# Patient Record
Sex: FEMALE | Race: WHITE | NOT HISPANIC OR LATINO
[De-identification: names, ages, dates, MRNs, and addresses within clinical notes are randomized per-mention and may not be internally consistent; named-entity substitution may affect disease eponyms.]

---

## 2017-08-08 DIAGNOSIS — Z98.890 OTHER SPECIFIED POSTPROCEDURAL STATES: ICD-10-CM

## 2017-08-10 PROBLEM — Z98.890 H/O COLONOSCOPY: Status: RESOLVED | Noted: 2017-08-10 | Resolved: 2017-08-10

## 2017-10-23 ENCOUNTER — APPOINTMENT (OUTPATIENT)
Dept: INTERNAL MEDICINE | Facility: CLINIC | Age: 57
End: 2017-10-23
Payer: COMMERCIAL

## 2017-10-23 ENCOUNTER — NON-APPOINTMENT (OUTPATIENT)
Age: 57
End: 2017-10-23

## 2017-10-23 VITALS
TEMPERATURE: 98.8 F | HEART RATE: 61 BPM | BODY MASS INDEX: 26.29 KG/M2 | DIASTOLIC BLOOD PRESSURE: 82 MMHG | SYSTOLIC BLOOD PRESSURE: 119 MMHG | HEIGHT: 64 IN | WEIGHT: 154 LBS | OXYGEN SATURATION: 99 %

## 2017-10-23 DIAGNOSIS — Z83.49 FAMILY HISTORY OF OTHER ENDOCRINE, NUTRITIONAL AND METABOLIC DISEASES: ICD-10-CM

## 2017-10-23 DIAGNOSIS — Z23 ENCOUNTER FOR IMMUNIZATION: ICD-10-CM

## 2017-10-23 DIAGNOSIS — Z92.89 PERSONAL HISTORY OF OTHER MEDICAL TREATMENT: ICD-10-CM

## 2017-10-23 DIAGNOSIS — Z98.890 OTHER SPECIFIED POSTPROCEDURAL STATES: ICD-10-CM

## 2017-10-23 PROCEDURE — 90670 PCV13 VACCINE IM: CPT

## 2017-10-23 PROCEDURE — 90686 IIV4 VACC NO PRSV 0.5 ML IM: CPT

## 2017-10-23 PROCEDURE — 90472 IMMUNIZATION ADMIN EACH ADD: CPT

## 2017-10-23 PROCEDURE — 99396 PREV VISIT EST AGE 40-64: CPT | Mod: 25

## 2017-10-23 PROCEDURE — 93000 ELECTROCARDIOGRAM COMPLETE: CPT

## 2017-10-23 PROCEDURE — 36415 COLL VENOUS BLD VENIPUNCTURE: CPT

## 2017-10-23 PROCEDURE — G0008: CPT

## 2017-10-30 LAB
25(OH)D3 SERPL-MCNC: 37 NG/ML
ALBUMIN SERPL ELPH-MCNC: 4.8 G/DL
ALP BLD-CCNC: 76 U/L
ALT SERPL-CCNC: 35 U/L
ANION GAP SERPL CALC-SCNC: 19 MMOL/L
APPEARANCE: CLEAR
AST SERPL-CCNC: 32 U/L
BASOPHILS # BLD AUTO: 0.01 K/UL
BASOPHILS NFR BLD AUTO: 0.2 %
BILIRUB SERPL-MCNC: 0.5 MG/DL
BILIRUBIN URINE: NEGATIVE
BLOOD URINE: NEGATIVE
BUN SERPL-MCNC: 13 MG/DL
CALCIUM SERPL-MCNC: 9.9 MG/DL
CHLORIDE SERPL-SCNC: 98 MMOL/L
CHOLEST SERPL-MCNC: 265 MG/DL
CHOLEST/HDLC SERPL: 2.6 RATIO
CO2 SERPL-SCNC: 24 MMOL/L
COLOR: YELLOW
CREAT SERPL-MCNC: 0.81 MG/DL
EOSINOPHIL # BLD AUTO: 0.1 K/UL
EOSINOPHIL NFR BLD AUTO: 2.1 %
GLUCOSE QUALITATIVE U: NEGATIVE MG/DL
GLUCOSE SERPL-MCNC: 99 MG/DL
HBA1C MFR BLD HPLC: 5.1 %
HCT VFR BLD CALC: 42.8 %
HCV AB SER QL: NONREACTIVE
HCV S/CO RATIO: 0.06 S/CO
HDLC SERPL-MCNC: 101 MG/DL
HGB BLD-MCNC: 13.9 G/DL
IMM GRANULOCYTES NFR BLD AUTO: 0 %
KETONES URINE: NEGATIVE
LDLC SERPL CALC-MCNC: 145 MG/DL
LEUKOCYTE ESTERASE URINE: NEGATIVE
LYMPHOCYTES # BLD AUTO: 1.6 K/UL
LYMPHOCYTES NFR BLD AUTO: 33.2 %
MAN DIFF?: NORMAL
MCHC RBC-ENTMCNC: 32.5 GM/DL
MCHC RBC-ENTMCNC: 33 PG
MCV RBC AUTO: 101.7 FL
MONOCYTES # BLD AUTO: 0.31 K/UL
MONOCYTES NFR BLD AUTO: 6.4 %
NEUTROPHILS # BLD AUTO: 2.8 K/UL
NEUTROPHILS NFR BLD AUTO: 58.1 %
NITRITE URINE: NEGATIVE
PH URINE: 7
PLATELET # BLD AUTO: 211 K/UL
POTASSIUM SERPL-SCNC: 4.6 MMOL/L
PROT SERPL-MCNC: 7.3 G/DL
PROTEIN URINE: NEGATIVE MG/DL
RBC # BLD: 4.21 M/UL
RBC # FLD: 14.7 %
SODIUM SERPL-SCNC: 141 MMOL/L
SPECIFIC GRAVITY URINE: 1.01
T4 FREE SERPL-MCNC: 1.1 NG/DL
TRIGL SERPL-MCNC: 96 MG/DL
TSH SERPL-ACNC: 1.51 UIU/ML
UROBILINOGEN URINE: NEGATIVE MG/DL
WBC # FLD AUTO: 4.82 K/UL

## 2017-11-07 ENCOUNTER — TRANSCRIPTION ENCOUNTER (OUTPATIENT)
Age: 57
End: 2017-11-07

## 2018-02-12 ENCOUNTER — MEDICATION RENEWAL (OUTPATIENT)
Age: 58
End: 2018-02-12

## 2018-07-20 ENCOUNTER — MEDICATION RENEWAL (OUTPATIENT)
Age: 58
End: 2018-07-20

## 2019-03-25 ENCOUNTER — LABORATORY RESULT (OUTPATIENT)
Age: 59
End: 2019-03-25

## 2019-03-25 ENCOUNTER — APPOINTMENT (OUTPATIENT)
Dept: INTERNAL MEDICINE | Facility: CLINIC | Age: 59
End: 2019-03-25
Payer: COMMERCIAL

## 2019-03-25 VITALS
SYSTOLIC BLOOD PRESSURE: 128 MMHG | WEIGHT: 153 LBS | BODY MASS INDEX: 26.12 KG/M2 | HEIGHT: 64 IN | HEART RATE: 67 BPM | OXYGEN SATURATION: 98 % | TEMPERATURE: 98.2 F | DIASTOLIC BLOOD PRESSURE: 86 MMHG

## 2019-03-25 DIAGNOSIS — Z85.89 PERSONAL HISTORY OF MALIGNANT NEOPLASM OF OTHER ORGANS AND SYSTEMS: ICD-10-CM

## 2019-03-25 DIAGNOSIS — F98.8 OTHER SPECIFIED BEHAVIORAL AND EMOTIONAL DISORDERS WITH ONSET USUALLY OCCURRING IN CHILDHOOD AND ADOLESCENCE: ICD-10-CM

## 2019-03-25 PROCEDURE — 99396 PREV VISIT EST AGE 40-64: CPT | Mod: 25

## 2019-03-25 PROCEDURE — 94010 BREATHING CAPACITY TEST: CPT

## 2019-03-25 PROCEDURE — 36415 COLL VENOUS BLD VENIPUNCTURE: CPT

## 2019-03-25 RX ORDER — BECLOMETHASONE DIPROPIONATE 40 UG/1
40 AEROSOL, METERED RESPIRATORY (INHALATION) TWICE DAILY
Qty: 1 | Refills: 6 | Status: DISCONTINUED | COMMUNITY
End: 2019-03-25

## 2019-03-25 RX ORDER — DEXTROAMPHETAMINE SACCHARATE, AMPHETAMINE ASPARTATE, DEXTROAMPHETAMINE SULFATE AND AMPHETAMINE SULFATE 2.5; 2.5; 2.5; 2.5 MG/1; MG/1; MG/1; MG/1
10 TABLET ORAL
Qty: 30 | Refills: 0 | Status: ACTIVE | COMMUNITY
Start: 2019-03-25

## 2019-03-25 NOTE — PHYSICAL EXAM
[No Acute Distress] : no acute distress [Well Nourished] : well nourished [Well Developed] : well developed [Well-Appearing] : well-appearing [Normal Sclera/Conjunctiva] : normal sclera/conjunctiva [EOMI] : extraocular movements intact [Normal Outer Ear/Nose] : the outer ears and nose were normal in appearance [Supple] : supple [No Lymphadenopathy] : no lymphadenopathy [Thyroid Normal, No Nodules] : the thyroid was normal and there were no nodules present [No Respiratory Distress] : no respiratory distress  [Clear to Auscultation] : lungs were clear to auscultation bilaterally [No Accessory Muscle Use] : no accessory muscle use [Normal Rate] : normal rate  [Regular Rhythm] : with a regular rhythm [Normal S1, S2] : normal S1 and S2 [No Murmur] : no murmur heard [No Varicosities] : no varicosities [No Edema] : there was no peripheral edema [No Extremity Clubbing/Cyanosis] : no extremity clubbing/cyanosis [Soft] : abdomen soft [Non Tender] : non-tender [Non-distended] : non-distended [No Masses] : no abdominal mass palpated [No HSM] : no HSM [Normal Supraclavicular Nodes] : no supraclavicular lymphadenopathy [Normal Anterior Cervical Nodes] : no anterior cervical lymphadenopathy [No Rash] : no rash [Normal Gait] : normal gait [Coordination Grossly Intact] : coordination grossly intact [No Focal Deficits] : no focal deficits [Normal Affect] : the affect was normal [Alert and Oriented x3] : oriented to person, place, and time [Normal Mood] : the mood was normal [Normal Insight/Judgement] : insight and judgment were intact

## 2019-03-25 NOTE — HISTORY OF PRESENT ILLNESS
[FreeTextEntry1] : annual exam [de-identified] : annual\par - doing well since last visit 10/2017\par - no new MD visits or hospitalizations\par \par asthma\par - well controlled\par - compliant w/ inhalers\par \par ADHD/depression\par - compliant w/ medications\par - follows w/ psych\par \par b/l hip pain\par - h/o labral tearing\par - last ortho eval was 5 yrs ago\par - at that time no surgical intervention needed\par - now will have b/l hip pain after simple house choirs ie vacuuming\par - pain will prevent her from sleeping. \par \par HCM\par - needs mammo

## 2019-03-28 LAB
25(OH)D3 SERPL-MCNC: 34.3 NG/ML
ALBUMIN SERPL ELPH-MCNC: 4.6 G/DL
ALP BLD-CCNC: 98 U/L
ALT SERPL-CCNC: 33 U/L
ANION GAP SERPL CALC-SCNC: 13 MMOL/L
APPEARANCE: CLEAR
AST SERPL-CCNC: 32 U/L
BASOPHILS # BLD AUTO: 0.02 K/UL
BASOPHILS NFR BLD AUTO: 0.3 %
BILIRUB SERPL-MCNC: 0.5 MG/DL
BILIRUBIN URINE: NEGATIVE
BLOOD URINE: NEGATIVE
BUN SERPL-MCNC: 13 MG/DL
CALCIUM SERPL-MCNC: 9.8 MG/DL
CHLORIDE SERPL-SCNC: 100 MMOL/L
CHOLEST SERPL-MCNC: 230 MG/DL
CHOLEST/HDLC SERPL: 2.6 RATIO
CO2 SERPL-SCNC: 26 MMOL/L
COLOR: NORMAL
CREAT SERPL-MCNC: 0.66 MG/DL
EOSINOPHIL # BLD AUTO: 0.1 K/UL
EOSINOPHIL NFR BLD AUTO: 1.7 %
ESTIMATED AVERAGE GLUCOSE: 103 MG/DL
GLUCOSE QUALITATIVE U: NEGATIVE
GLUCOSE SERPL-MCNC: 92 MG/DL
HBA1C MFR BLD HPLC: 5.2 %
HCT VFR BLD CALC: 43.4 %
HCV AB SER QL: NONREACTIVE
HCV S/CO RATIO: 0.06 S/CO
HDLC SERPL-MCNC: 89 MG/DL
HGB BLD-MCNC: 13.8 G/DL
IMM GRANULOCYTES NFR BLD AUTO: 0.3 %
KETONES URINE: NORMAL
LDLC SERPL CALC-MCNC: 125 MG/DL
LEUKOCYTE ESTERASE URINE: NEGATIVE
LYMPHOCYTES # BLD AUTO: 1.42 K/UL
LYMPHOCYTES NFR BLD AUTO: 23.7 %
MAN DIFF?: NORMAL
MCHC RBC-ENTMCNC: 31.8 GM/DL
MCHC RBC-ENTMCNC: 33.9 PG
MCV RBC AUTO: 106.6 FL
MONOCYTES # BLD AUTO: 0.44 K/UL
MONOCYTES NFR BLD AUTO: 7.4 %
NEUTROPHILS # BLD AUTO: 3.98 K/UL
NEUTROPHILS NFR BLD AUTO: 66.6 %
NITRITE URINE: NEGATIVE
PH URINE: 5
PLATELET # BLD AUTO: 229 K/UL
POTASSIUM SERPL-SCNC: 4.6 MMOL/L
PROT SERPL-MCNC: 7 G/DL
PROTEIN URINE: NEGATIVE
RBC # BLD: 4.07 M/UL
RBC # FLD: 13.7 %
SODIUM SERPL-SCNC: 139 MMOL/L
SPECIFIC GRAVITY URINE: 1.01
T4 FREE SERPL-MCNC: 1 NG/DL
TRIGL SERPL-MCNC: 78 MG/DL
TSH SERPL-ACNC: 2.29 UIU/ML
UROBILINOGEN URINE: NORMAL
WBC # FLD AUTO: 5.98 K/UL

## 2019-08-02 ENCOUNTER — MEDICATION RENEWAL (OUTPATIENT)
Age: 59
End: 2019-08-02

## 2019-10-09 ENCOUNTER — MEDICATION RENEWAL (OUTPATIENT)
Age: 59
End: 2019-10-09

## 2019-12-06 ENCOUNTER — RX RENEWAL (OUTPATIENT)
Age: 59
End: 2019-12-06

## 2019-12-23 ENCOUNTER — RX RENEWAL (OUTPATIENT)
Age: 59
End: 2019-12-23

## 2020-08-06 ENCOUNTER — APPOINTMENT (OUTPATIENT)
Dept: INTERNAL MEDICINE | Facility: CLINIC | Age: 60
End: 2020-08-06
Payer: COMMERCIAL

## 2020-08-06 VITALS
SYSTOLIC BLOOD PRESSURE: 128 MMHG | DIASTOLIC BLOOD PRESSURE: 82 MMHG | BODY MASS INDEX: 26.63 KG/M2 | HEART RATE: 73 BPM | HEIGHT: 64 IN | OXYGEN SATURATION: 98 % | TEMPERATURE: 98.5 F | WEIGHT: 156 LBS

## 2020-08-06 DIAGNOSIS — K44.9 DIAPHRAGMATIC HERNIA W/OUT OBSTRUCTION OR GANGRENE: ICD-10-CM

## 2020-08-06 DIAGNOSIS — Z11.59 ENCOUNTER FOR SCREENING FOR OTHER VIRAL DISEASES: ICD-10-CM

## 2020-08-06 DIAGNOSIS — Z23 ENCOUNTER FOR IMMUNIZATION: ICD-10-CM

## 2020-08-06 DIAGNOSIS — K21.9 GASTRO-ESOPHAGEAL REFLUX DISEASE W/OUT ESOPHAGITIS: ICD-10-CM

## 2020-08-06 PROCEDURE — 90732 PPSV23 VACC 2 YRS+ SUBQ/IM: CPT

## 2020-08-06 PROCEDURE — 36415 COLL VENOUS BLD VENIPUNCTURE: CPT

## 2020-08-06 PROCEDURE — G0009: CPT

## 2020-08-06 PROCEDURE — 99396 PREV VISIT EST AGE 40-64: CPT | Mod: 25

## 2020-08-06 NOTE — HEALTH RISK ASSESSMENT
[] : No [No] : No [Change in mental status noted] : No change in mental status noted [Language] : denies difficulty with language [Behavior] : denies difficulty with behavior [Handling Complex Tasks] : denies difficulty handling complex tasks [Learning/Retaining New Information] : denies difficulty learning/retaining new information [Spatial Ability and Orientation] : denies difficulty with spatial ability and orientation [Reasoning] : denies difficulty with reasoning [None] : None [With Family] : lives with family [] :  [Fully functional (using the telephone, shopping, preparing meals, housekeeping, doing laundry, using] : Fully functional and needs no help or supervision to perform IADLs (using the telephone, shopping, preparing meals, housekeeping, doing laundry, using transportation, managing medications and managing finances) [Fully functional (bathing, dressing, toileting, transferring, walking, feeding)] : Fully functional (bathing, dressing, toileting, transferring, walking, feeding) [With Patient/Caregiver] : With Patient/Caregiver [AdvancecareDate] : 08/06/2020

## 2020-08-06 NOTE — HISTORY OF PRESENT ILLNESS
[FreeTextEntry1] : annual exam [de-identified] : annual\par - doing well\par - w/o specific complaints\par - has been home during pandemic\par \par Asthma\par - well controlled\par - compiant w/ inhalers\par \par Acid reflux\par - worse b/c of hiatal hernia\par - last EGD 1 year ago\par - states h pylori negative\par - has been on PPI daily, regardless of sx.\par \par HCM\par - needs mammo\par - needs pna23 vax

## 2020-08-06 NOTE — PHYSICAL EXAM
[No Acute Distress] : no acute distress [Well-Appearing] : well-appearing [Well Developed] : well developed [Well Nourished] : well nourished [Normal Sclera/Conjunctiva] : normal sclera/conjunctiva [EOMI] : extraocular movements intact [Normal Outer Ear/Nose] : the outer ears and nose were normal in appearance [No Lymphadenopathy] : no lymphadenopathy [Thyroid Normal, No Nodules] : the thyroid was normal and there were no nodules present [No Respiratory Distress] : no respiratory distress  [Supple] : supple [No Accessory Muscle Use] : no accessory muscle use [Clear to Auscultation] : lungs were clear to auscultation bilaterally [Regular Rhythm] : with a regular rhythm [Normal Rate] : normal rate  [Normal S1, S2] : normal S1 and S2 [No Murmur] : no murmur heard [No Varicosities] : no varicosities [No Edema] : there was no peripheral edema [No Palpable Aorta] : no palpable aorta [Soft] : abdomen soft [Non-distended] : non-distended [Non Tender] : non-tender [No Masses] : no abdominal mass palpated [No HSM] : no HSM [Normal Supraclavicular Nodes] : no supraclavicular lymphadenopathy [Normal Anterior Cervical Nodes] : no anterior cervical lymphadenopathy [No Joint Swelling] : no joint swelling [Grossly Normal Strength/Tone] : grossly normal strength/tone [No Rash] : no rash [No Focal Deficits] : no focal deficits [Coordination Grossly Intact] : coordination grossly intact [Normal Gait] : normal gait [Normal Affect] : the affect was normal [Alert and Oriented x3] : oriented to person, place, and time [Normal Mood] : the mood was normal [Normal Insight/Judgement] : insight and judgment were intact

## 2020-08-11 ENCOUNTER — TRANSCRIPTION ENCOUNTER (OUTPATIENT)
Age: 60
End: 2020-08-11

## 2020-08-11 LAB
25(OH)D3 SERPL-MCNC: 41.8 NG/ML
ALBUMIN SERPL ELPH-MCNC: 5.1 G/DL
ALP BLD-CCNC: 84 U/L
ALT SERPL-CCNC: 44 U/L
ANION GAP SERPL CALC-SCNC: 13 MMOL/L
APPEARANCE: CLEAR
AST SERPL-CCNC: 36 U/L
BASOPHILS # BLD AUTO: 0.05 K/UL
BASOPHILS NFR BLD AUTO: 1 %
BILIRUB SERPL-MCNC: 0.6 MG/DL
BILIRUBIN URINE: NEGATIVE
BLOOD URINE: NEGATIVE
BUN SERPL-MCNC: 13 MG/DL
CALCIUM SERPL-MCNC: 9.9 MG/DL
CHLORIDE SERPL-SCNC: 100 MMOL/L
CHOLEST SERPL-MCNC: 257 MG/DL
CHOLEST/HDLC SERPL: 3 RATIO
CO2 SERPL-SCNC: 26 MMOL/L
COLOR: NORMAL
CREAT SERPL-MCNC: 0.75 MG/DL
EOSINOPHIL # BLD AUTO: 0.2 K/UL
EOSINOPHIL NFR BLD AUTO: 3.9 %
ESTIMATED AVERAGE GLUCOSE: 103 MG/DL
GLUCOSE QUALITATIVE U: NEGATIVE
GLUCOSE SERPL-MCNC: 89 MG/DL
HBA1C MFR BLD HPLC: 5.2 %
HCT VFR BLD CALC: 44.2 %
HDLC SERPL-MCNC: 86 MG/DL
HGB BLD-MCNC: 14.3 G/DL
IMM GRANULOCYTES NFR BLD AUTO: 0.2 %
KETONES URINE: ABNORMAL
LDLC SERPL CALC-MCNC: 153 MG/DL
LEUKOCYTE ESTERASE URINE: NEGATIVE
LYMPHOCYTES # BLD AUTO: 1.36 K/UL
LYMPHOCYTES NFR BLD AUTO: 26.4 %
MAN DIFF?: NORMAL
MCHC RBC-ENTMCNC: 32.4 GM/DL
MCHC RBC-ENTMCNC: 33.8 PG
MCV RBC AUTO: 104.5 FL
MONOCYTES # BLD AUTO: 0.38 K/UL
MONOCYTES NFR BLD AUTO: 7.4 %
NEUTROPHILS # BLD AUTO: 3.15 K/UL
NEUTROPHILS NFR BLD AUTO: 61.1 %
NITRITE URINE: NEGATIVE
PH URINE: 5
PLATELET # BLD AUTO: 242 K/UL
POTASSIUM SERPL-SCNC: 5 MMOL/L
PROT SERPL-MCNC: 7 G/DL
PROTEIN URINE: NEGATIVE
RBC # BLD: 4.23 M/UL
RBC # FLD: 13.2 %
SARS-COV-2 IGG SERPL IA-ACNC: 0.07 INDEX
SARS-COV-2 IGG SERPL QL IA: NEGATIVE
SODIUM SERPL-SCNC: 140 MMOL/L
SPECIFIC GRAVITY URINE: 1.01
T4 FREE SERPL-MCNC: 1 NG/DL
TRIGL SERPL-MCNC: 91 MG/DL
TSH SERPL-ACNC: 3.15 UIU/ML
UROBILINOGEN URINE: NORMAL
WBC # FLD AUTO: 5.15 K/UL

## 2020-08-11 RX ORDER — MELATONIN 10 MG
CAPSULE ORAL
Refills: 0 | Status: ACTIVE | COMMUNITY

## 2020-08-18 ENCOUNTER — TRANSCRIPTION ENCOUNTER (OUTPATIENT)
Age: 60
End: 2020-08-18

## 2020-09-03 ENCOUNTER — TRANSCRIPTION ENCOUNTER (OUTPATIENT)
Age: 60
End: 2020-09-03

## 2022-11-21 ENCOUNTER — APPOINTMENT (OUTPATIENT)
Dept: INTERNAL MEDICINE | Facility: CLINIC | Age: 62
End: 2022-11-21

## 2022-11-21 VITALS
BODY MASS INDEX: 23.9 KG/M2 | HEIGHT: 64 IN | RESPIRATION RATE: 18 BRPM | WEIGHT: 140 LBS | HEART RATE: 69 BPM | TEMPERATURE: 98.1 F | SYSTOLIC BLOOD PRESSURE: 135 MMHG | DIASTOLIC BLOOD PRESSURE: 81 MMHG | OXYGEN SATURATION: 99 %

## 2022-11-21 DIAGNOSIS — M25.552 PAIN IN RIGHT HIP: ICD-10-CM

## 2022-11-21 DIAGNOSIS — B00.1 HERPESVIRAL VESICULAR DERMATITIS: ICD-10-CM

## 2022-11-21 DIAGNOSIS — Z00.00 ENCOUNTER FOR GENERAL ADULT MEDICAL EXAMINATION W/OUT ABNORMAL FINDINGS: ICD-10-CM

## 2022-11-21 DIAGNOSIS — J45.909 UNSPECIFIED ASTHMA, UNCOMPLICATED: ICD-10-CM

## 2022-11-21 DIAGNOSIS — M25.551 PAIN IN RIGHT HIP: ICD-10-CM

## 2022-11-21 DIAGNOSIS — Z23 ENCOUNTER FOR IMMUNIZATION: ICD-10-CM

## 2022-11-21 DIAGNOSIS — F32.A DEPRESSION, UNSPECIFIED: ICD-10-CM

## 2022-11-21 PROCEDURE — 36415 COLL VENOUS BLD VENIPUNCTURE: CPT

## 2022-11-21 PROCEDURE — 99396 PREV VISIT EST AGE 40-64: CPT | Mod: 25

## 2022-11-21 PROCEDURE — G0008: CPT

## 2022-11-21 PROCEDURE — 90686 IIV4 VACC NO PRSV 0.5 ML IM: CPT

## 2022-11-21 RX ORDER — VORTIOXETINE 20 MG/1
20 TABLET, FILM COATED ORAL
Qty: 90 | Refills: 0 | Status: COMPLETED | COMMUNITY
Start: 2022-06-02

## 2022-11-21 RX ORDER — CHLORHEXIDINE GLUCONATE, 0.12% ORAL RINSE 1.2 MG/ML
0.12 SOLUTION DENTAL
Qty: 473 | Refills: 0 | Status: COMPLETED | COMMUNITY
Start: 2022-07-14

## 2022-11-21 RX ORDER — VORTIOXETINE 20 MG/1
20 TABLET, FILM COATED ORAL
Qty: 90 | Refills: 0 | Status: ACTIVE | COMMUNITY
Start: 2019-03-25

## 2022-11-21 RX ORDER — CIMETIDINE 200 MG/1
200 TABLET, FILM COATED ORAL TWICE DAILY
Qty: 180 | Refills: 3 | Status: COMPLETED | COMMUNITY
Start: 2020-08-06 | End: 2022-11-21

## 2022-11-21 RX ORDER — IBUPROFEN 600 MG/1
600 TABLET, FILM COATED ORAL
Qty: 20 | Refills: 0 | Status: COMPLETED | COMMUNITY
Start: 2022-07-14

## 2022-11-21 RX ORDER — BECLOMETHASONE DIPROPIONATE HFA 40 UG/1
40 AEROSOL, METERED RESPIRATORY (INHALATION)
Qty: 10.6 | Refills: 7 | Status: ACTIVE | COMMUNITY
Start: 2019-03-25 | End: 1900-01-01

## 2022-11-21 RX ORDER — AMOXICILLIN 500 MG/1
500 CAPSULE ORAL
Qty: 21 | Refills: 0 | Status: COMPLETED | COMMUNITY
Start: 2022-07-14

## 2022-11-21 NOTE — PHYSICAL EXAM
[Normal Sclera/Conjunctiva] : normal sclera/conjunctiva [PERRL] : pupils equal round and reactive to light [EOMI] : extraocular movements intact [Normal Supraclavicular Nodes] : no supraclavicular lymphadenopathy [Normal Posterior Cervical Nodes] : no posterior cervical lymphadenopathy [Normal Anterior Cervical Nodes] : no anterior cervical lymphadenopathy [Normal] : affect was normal and insight and judgment were intact

## 2022-11-21 NOTE — HISTORY OF PRESENT ILLNESS
[FreeTextEntry1] : annual [de-identified] : Patient is a 62 year old female with PMH of asthma and depression who presents to the office for an annual physical exam.\par \par Asthma: well controlled, cold air bothers her; uses combivent inhaler during winter months no more than twice a day. Hasn't had to use it recently.\par Depression: well managed on trintellix, sees psychiatrist. Does not have a therapist, pt states she does not want one at this time. Denies thoughts of harm to others or herself.\par \par refill: combivent, famiciclovir, qvar\par rx: mammo, dexa\par \par HCM\par Influenza: given today.\par COVID vaccine: x2 pfizer 2021, needs first booster.\par Mammo: last one 2020, needs new rx.\par Colonoscopy: 2017, UTD WNL.\par DEXA Scan: needs rx.\par GYN: normal pap smears in the past.\par Optometry: wears glasses, no new changes.\par Dentist: had a cleaning a week ago\par Dermatology: has seen in the past, not recently.\par Diet/exercise: Walks dog daily, eats a regular diet with protein and vegetables as well as fiber.\par Alcohol/tobacco: denies tobacco use, one drink per week.\par Caffeine: one cup of tea a day.\par

## 2022-11-21 NOTE — REVIEW OF SYSTEMS
[Constipation] : constipation [Depression] : depression [Negative] : Heme/Lymph [Abdominal Pain] : no abdominal pain [Nausea] : no nausea [Diarrhea] : diarrhea [Vomiting] : no vomiting [Heartburn] : no heartburn [Melena] : no melena [Suicidal] : not suicidal [Insomnia] : no insomnia [Anxiety] : no anxiety

## 2022-11-21 NOTE — END OF VISIT
[FreeTextEntry3] : Nurse practitioner student acted as scribe during office visit. I was present with the Nurse Practitioner student during the key portions of the patient’s visit.

## 2022-11-22 ENCOUNTER — TRANSCRIPTION ENCOUNTER (OUTPATIENT)
Age: 62
End: 2022-11-22

## 2022-11-22 LAB
ALBUMIN SERPL ELPH-MCNC: 4.4 G/DL
ALP BLD-CCNC: 83 U/L
ALT SERPL-CCNC: 21 U/L
ANION GAP SERPL CALC-SCNC: 11 MMOL/L
APPEARANCE: CLEAR
AST SERPL-CCNC: 23 U/L
BASOPHILS # BLD AUTO: 0.03 K/UL
BASOPHILS NFR BLD AUTO: 0.7 %
BILIRUB SERPL-MCNC: 0.4 MG/DL
BILIRUBIN URINE: NEGATIVE
BLOOD URINE: NEGATIVE
BUN SERPL-MCNC: 11 MG/DL
CALCIUM SERPL-MCNC: 9.2 MG/DL
CHLORIDE SERPL-SCNC: 100 MMOL/L
CHOLEST SERPL-MCNC: 183 MG/DL
CO2 SERPL-SCNC: 26 MMOL/L
COLOR: NORMAL
CREAT SERPL-MCNC: 0.64 MG/DL
EGFR: 101 ML/MIN/1.73M2
EOSINOPHIL # BLD AUTO: 0.12 K/UL
EOSINOPHIL NFR BLD AUTO: 2.7 %
ESTIMATED AVERAGE GLUCOSE: 103 MG/DL
GLUCOSE QUALITATIVE U: NEGATIVE
GLUCOSE SERPL-MCNC: 114 MG/DL
HBA1C MFR BLD HPLC: 5.2 %
HCT VFR BLD CALC: 41.2 %
HDLC SERPL-MCNC: 78 MG/DL
HGB BLD-MCNC: 13.8 G/DL
IMM GRANULOCYTES NFR BLD AUTO: 0.2 %
KETONES URINE: NEGATIVE
LDLC SERPL CALC-MCNC: 89 MG/DL
LEUKOCYTE ESTERASE URINE: NEGATIVE
LYMPHOCYTES # BLD AUTO: 1.37 K/UL
LYMPHOCYTES NFR BLD AUTO: 30.4 %
MAN DIFF?: NORMAL
MCHC RBC-ENTMCNC: 33.2 PG
MCHC RBC-ENTMCNC: 33.5 GM/DL
MCV RBC AUTO: 99 FL
MONOCYTES # BLD AUTO: 0.35 K/UL
MONOCYTES NFR BLD AUTO: 7.8 %
NEUTROPHILS # BLD AUTO: 2.63 K/UL
NEUTROPHILS NFR BLD AUTO: 58.2 %
NITRITE URINE: NEGATIVE
NONHDLC SERPL-MCNC: 104 MG/DL
PH URINE: 5.5
PLATELET # BLD AUTO: 219 K/UL
POTASSIUM SERPL-SCNC: 4.4 MMOL/L
PROT SERPL-MCNC: 6.5 G/DL
PROTEIN URINE: NEGATIVE
RBC # BLD: 4.16 M/UL
RBC # FLD: 13.9 %
SODIUM SERPL-SCNC: 137 MMOL/L
SPECIFIC GRAVITY URINE: 1.01
TRIGL SERPL-MCNC: 77 MG/DL
TSH SERPL-ACNC: 2.13 UIU/ML
UROBILINOGEN URINE: NORMAL
WBC # FLD AUTO: 4.51 K/UL

## 2022-11-23 ENCOUNTER — TRANSCRIPTION ENCOUNTER (OUTPATIENT)
Age: 62
End: 2022-11-23

## 2022-12-22 NOTE — HEALTH RISK ASSESSMENT
[Excellent] : ~his/her~  mood as  excellent [] : No [No falls in past year] : Patient reported no falls in the past year [0] : 2) Feeling down, depressed, or hopeless: Not at all (0) [[id number]] : 0 No

## 2023-02-17 ENCOUNTER — RX RENEWAL (OUTPATIENT)
Age: 63
End: 2023-02-17

## 2023-02-17 RX ORDER — FAMCICLOVIR 500 MG/1
500 TABLET, FILM COATED ORAL
Qty: 180 | Refills: 1 | Status: ACTIVE | COMMUNITY
Start: 2017-10-23 | End: 1900-01-01

## 2023-02-22 ENCOUNTER — RX RENEWAL (OUTPATIENT)
Age: 63
End: 2023-02-22

## 2024-09-16 ENCOUNTER — TRANSCRIPTION ENCOUNTER (OUTPATIENT)
Age: 64
End: 2024-09-16

## 2025-05-12 ENCOUNTER — RESULT CHARGE (OUTPATIENT)
Age: 65
End: 2025-05-12

## 2025-05-12 ENCOUNTER — NON-APPOINTMENT (OUTPATIENT)
Age: 65
End: 2025-05-12

## 2025-05-12 ENCOUNTER — APPOINTMENT (OUTPATIENT)
Dept: INTERNAL MEDICINE | Facility: CLINIC | Age: 65
End: 2025-05-12
Payer: COMMERCIAL

## 2025-05-12 VITALS
OXYGEN SATURATION: 97 % | HEART RATE: 68 BPM | HEIGHT: 64 IN | BODY MASS INDEX: 24.8 KG/M2 | WEIGHT: 145.25 LBS | SYSTOLIC BLOOD PRESSURE: 136 MMHG | TEMPERATURE: 97.9 F | DIASTOLIC BLOOD PRESSURE: 79 MMHG

## 2025-05-12 DIAGNOSIS — Z86.018 PERSONAL HISTORY OF OTHER BENIGN NEOPLASM: ICD-10-CM

## 2025-05-12 DIAGNOSIS — F32.A DEPRESSION, UNSPECIFIED: ICD-10-CM

## 2025-05-12 DIAGNOSIS — Z00.00 ENCOUNTER FOR GENERAL ADULT MEDICAL EXAMINATION W/OUT ABNORMAL FINDINGS: ICD-10-CM

## 2025-05-12 DIAGNOSIS — J45.909 UNSPECIFIED ASTHMA, UNCOMPLICATED: ICD-10-CM

## 2025-05-12 DIAGNOSIS — F10.11 ALCOHOL ABUSE, IN REMISSION: ICD-10-CM

## 2025-05-12 DIAGNOSIS — S82.009A UNSPECIFIED FRACTURE OF UNSPECIFIED PATELLA, INITIAL ENCOUNTER FOR CLOSED FRACTURE: ICD-10-CM

## 2025-05-12 DIAGNOSIS — G47.33 OBSTRUCTIVE SLEEP APNEA (ADULT) (PEDIATRIC): ICD-10-CM

## 2025-05-12 DIAGNOSIS — S62.109A FRACTURE OF UNSPECIFIED CARPAL BONE, UNSPECIFIED WRIST, INITIAL ENCOUNTER FOR CLOSED FRACTURE: ICD-10-CM

## 2025-05-12 PROCEDURE — 36415 COLL VENOUS BLD VENIPUNCTURE: CPT

## 2025-05-12 PROCEDURE — 93000 ELECTROCARDIOGRAM COMPLETE: CPT

## 2025-05-12 PROCEDURE — 99396 PREV VISIT EST AGE 40-64: CPT

## 2025-05-12 RX ORDER — NALTREXONE 380 MG
380 KIT INTRAMUSCULAR
Refills: 0 | Status: ACTIVE | COMMUNITY
Start: 2025-05-12

## 2025-05-12 RX ORDER — RESPIRATORY SYNCYTIAL VISUS VACCINE RECOMBINANT, ADJUVANTED 120MCG/0.5
120 KIT INTRAMUSCULAR
Qty: 1 | Refills: 0 | Status: ACTIVE | OUTPATIENT
Start: 2025-05-12

## 2025-05-14 ENCOUNTER — TRANSCRIPTION ENCOUNTER (OUTPATIENT)
Age: 65
End: 2025-05-14

## 2025-05-14 DIAGNOSIS — M85.80 OTHER SPECIFIED DISORDERS OF BONE DENSITY AND STRUCTURE, UNSPECIFIED SITE: ICD-10-CM

## 2025-05-14 PROBLEM — F10.11 HISTORY OF ALCOHOL ABUSE: Status: RESOLVED | Noted: 2025-05-14 | Resolved: 2025-05-14

## 2025-05-14 PROBLEM — S62.109A WRIST FRACTURE: Status: ACTIVE | Noted: 2025-05-12

## 2025-05-14 PROBLEM — S82.009A PATELLA FRACTURE: Status: ACTIVE | Noted: 2025-05-12

## 2025-05-14 LAB
ALBUMIN SERPL ELPH-MCNC: 4.9 G/DL
ALP BLD-CCNC: 96 U/L
ALT SERPL-CCNC: 46 U/L
ANION GAP SERPL CALC-SCNC: 15 MMOL/L
APPEARANCE: CLEAR
AST SERPL-CCNC: 37 U/L
BACTERIA: NEGATIVE /HPF
BASOPHILS # BLD AUTO: 0.03 K/UL
BASOPHILS NFR BLD AUTO: 0.7 %
BILIRUB SERPL-MCNC: 0.6 MG/DL
BILIRUBIN URINE: NEGATIVE
BLOOD URINE: NEGATIVE
BUN SERPL-MCNC: 14 MG/DL
CALCIUM SERPL-MCNC: 9.7 MG/DL
CAST: 0 /LPF
CHLORIDE SERPL-SCNC: 100 MMOL/L
CHOLEST SERPL-MCNC: 236 MG/DL
CO2 SERPL-SCNC: 26 MMOL/L
COLOR: YELLOW
CREAT SERPL-MCNC: 0.71 MG/DL
EGFRCR SERPLBLD CKD-EPI 2021: 96 ML/MIN/1.73M2
EOSINOPHIL # BLD AUTO: 0.07 K/UL
EOSINOPHIL NFR BLD AUTO: 1.6 %
EPITHELIAL CELLS: 0 /HPF
ESTIMATED AVERAGE GLUCOSE: 105 MG/DL
GLUCOSE QUALITATIVE U: NEGATIVE MG/DL
GLUCOSE SERPL-MCNC: 96 MG/DL
HBA1C MFR BLD HPLC: 5.3 %
HCT VFR BLD CALC: 42.5 %
HCV AB SER QL: NONREACTIVE
HCV S/CO RATIO: 0.09 S/CO
HDLC SERPL-MCNC: 78 MG/DL
HGB BLD-MCNC: 14.1 G/DL
IMM GRANULOCYTES NFR BLD AUTO: 0.2 %
KETONES URINE: NEGATIVE MG/DL
LDLC SERPL-MCNC: 143 MG/DL
LEUKOCYTE ESTERASE URINE: NEGATIVE
LYMPHOCYTES # BLD AUTO: 1.41 K/UL
LYMPHOCYTES NFR BLD AUTO: 32.4 %
MAN DIFF?: NORMAL
MCHC RBC-ENTMCNC: 33.2 G/DL
MCHC RBC-ENTMCNC: 33.4 PG
MCV RBC AUTO: 100.7 FL
MICROSCOPIC-UA: NORMAL
MONOCYTES # BLD AUTO: 0.41 K/UL
MONOCYTES NFR BLD AUTO: 9.4 %
NEUTROPHILS # BLD AUTO: 2.42 K/UL
NEUTROPHILS NFR BLD AUTO: 55.7 %
NITRITE URINE: NEGATIVE
NONHDLC SERPL-MCNC: 158 MG/DL
PH URINE: 5.5
PLATELET # BLD AUTO: 194 K/UL
POTASSIUM SERPL-SCNC: 4.2 MMOL/L
PROT SERPL-MCNC: 7.1 G/DL
PROTEIN URINE: NEGATIVE MG/DL
RBC # BLD: 4.22 M/UL
RBC # FLD: 13 %
RED BLOOD CELLS URINE: 0 /HPF
SODIUM SERPL-SCNC: 141 MMOL/L
SPECIFIC GRAVITY URINE: 1.01
TRIGL SERPL-MCNC: 87 MG/DL
TSH SERPL-ACNC: 2.22 UIU/ML
UROBILINOGEN URINE: 0.2 MG/DL
WBC # FLD AUTO: 4.35 K/UL
WHITE BLOOD CELLS URINE: 0 /HPF

## 2025-05-14 RX ORDER — NALTREXONE 380 MG
380 KIT INTRAMUSCULAR
Refills: 0 | Status: ACTIVE | COMMUNITY
Start: 2025-05-14

## 2025-05-14 RX ORDER — BUDESONIDE AND FORMOTEROL FUMARATE DIHYDRATE 80; 4.5 UG/1; UG/1
80-4.5 AEROSOL RESPIRATORY (INHALATION)
Qty: 1 | Refills: 2 | Status: ACTIVE | COMMUNITY
Start: 2025-05-12 | End: 1900-01-01

## 2025-05-16 ENCOUNTER — TRANSCRIPTION ENCOUNTER (OUTPATIENT)
Age: 65
End: 2025-05-16

## 2025-05-16 LAB — 25(OH)D3 SERPL-MCNC: 25 NG/ML
